# Patient Record
Sex: MALE | Race: BLACK OR AFRICAN AMERICAN
[De-identification: names, ages, dates, MRNs, and addresses within clinical notes are randomized per-mention and may not be internally consistent; named-entity substitution may affect disease eponyms.]

---

## 2019-02-26 ENCOUNTER — HOSPITAL ENCOUNTER (EMERGENCY)
Dept: HOSPITAL 72 - EMR | Age: 34
LOS: 1 days | Discharge: HOME | End: 2019-02-27
Payer: COMMERCIAL

## 2019-02-26 VITALS — HEIGHT: 71 IN | WEIGHT: 175 LBS | BODY MASS INDEX: 24.5 KG/M2

## 2019-02-26 VITALS — DIASTOLIC BLOOD PRESSURE: 74 MMHG | SYSTOLIC BLOOD PRESSURE: 124 MMHG

## 2019-02-26 DIAGNOSIS — R10.9: Primary | ICD-10-CM

## 2019-02-26 PROCEDURE — 80053 COMPREHEN METABOLIC PANEL: CPT

## 2019-02-26 PROCEDURE — 96374 THER/PROPH/DIAG INJ IV PUSH: CPT

## 2019-02-26 PROCEDURE — 99284 EMERGENCY DEPT VISIT MOD MDM: CPT

## 2019-02-26 PROCEDURE — 74177 CT ABD & PELVIS W/CONTRAST: CPT

## 2019-02-26 PROCEDURE — 83690 ASSAY OF LIPASE: CPT

## 2019-02-26 PROCEDURE — 81003 URINALYSIS AUTO W/O SCOPE: CPT

## 2019-02-26 PROCEDURE — 96375 TX/PRO/DX INJ NEW DRUG ADDON: CPT

## 2019-02-26 PROCEDURE — 85025 COMPLETE CBC W/AUTO DIFF WBC: CPT

## 2019-02-26 PROCEDURE — 36415 COLL VENOUS BLD VENIPUNCTURE: CPT

## 2019-02-26 NOTE — NUR
ED Nurse Note:

Patient walk in c/o abdominal pain radiating to left side of abdomen for 4 
days. Patient reports rash on torso and upper extremities for 3 days. AO4. NAD. 
Attached to monitor; VSS. IV access established. Blood drawn; sent down to lab.

## 2019-02-27 VITALS — DIASTOLIC BLOOD PRESSURE: 67 MMHG | SYSTOLIC BLOOD PRESSURE: 119 MMHG

## 2019-02-27 LAB
ADD MANUAL DIFF: NO
ALBUMIN SERPL-MCNC: 4 G/DL (ref 3.4–5)
ALBUMIN/GLOB SERPL: 1.1 {RATIO} (ref 1–2.7)
ALP SERPL-CCNC: 62 U/L (ref 46–116)
ALT SERPL-CCNC: 23 U/L (ref 12–78)
ANION GAP SERPL CALC-SCNC: 9 MMOL/L (ref 5–15)
APPEARANCE UR: CLEAR
APTT PPP: (no result) S
AST SERPL-CCNC: 18 U/L (ref 15–37)
BASOPHILS NFR BLD AUTO: 2 % (ref 0–2)
BILIRUB SERPL-MCNC: 0.4 MG/DL (ref 0.2–1)
BUN SERPL-MCNC: 16 MG/DL (ref 7–18)
CALCIUM SERPL-MCNC: 9.4 MG/DL (ref 8.5–10.1)
CHLORIDE SERPL-SCNC: 105 MMOL/L (ref 98–107)
CO2 SERPL-SCNC: 27 MMOL/L (ref 21–32)
CREAT SERPL-MCNC: 0.9 MG/DL (ref 0.55–1.3)
EOSINOPHIL NFR BLD AUTO: 2 % (ref 0–3)
ERYTHROCYTE [DISTWIDTH] IN BLOOD BY AUTOMATED COUNT: 12.8 % (ref 11.6–14.8)
GLOBULIN SER-MCNC: 3.5 G/DL
GLUCOSE UR STRIP-MCNC: NEGATIVE MG/DL
HCT VFR BLD CALC: 42.7 % (ref 42–52)
HGB BLD-MCNC: 15 G/DL (ref 14.2–18)
KETONES UR QL STRIP: NEGATIVE
LEUKOCYTE ESTERASE UR QL STRIP: NEGATIVE
LYMPHOCYTES NFR BLD AUTO: 45.1 % (ref 20–45)
MCV RBC AUTO: 85 FL (ref 80–99)
MONOCYTES NFR BLD AUTO: 7.2 % (ref 1–10)
NEUTROPHILS NFR BLD AUTO: 43.8 % (ref 45–75)
NITRITE UR QL STRIP: NEGATIVE
PH UR STRIP: 6 [PH] (ref 4.5–8)
PLATELET # BLD: 194 K/UL (ref 150–450)
POTASSIUM SERPL-SCNC: 4.1 MMOL/L (ref 3.5–5.1)
PROT UR QL STRIP: NEGATIVE
RBC # BLD AUTO: 5.02 M/UL (ref 4.7–6.1)
SODIUM SERPL-SCNC: 140 MMOL/L (ref 136–145)
SP GR UR STRIP: 1.01 (ref 1–1.03)
UROBILINOGEN UR-MCNC: NORMAL MG/DL (ref 0–1)
WBC # BLD AUTO: 8.6 K/UL (ref 4.8–10.8)

## 2019-02-27 NOTE — NUR
-------------------------------------------------------------------------------

          *** Note leida in EDM - 02/27/19 at 0402 by LCRISOSTOM ***          

-------------------------------------------------------------------------------

ED Nurse Note:

lactic reflex drawn; sent down to lab.

## 2019-02-27 NOTE — DIAGNOSTIC IMAGING REPORT
Indication: Abdominal pain

 

Technique: Continuous helical transaxial imaging of the abdomen and pelvis was

obtained from the lung bases to the pubic symphysis during intravenous contrast

administration. Coronal 2-D reformats were also obtained. Study obtained in a Siemens

sensation 64 slice CT.  Automatic Exposure Control was utilized.

 

Total Dose length Product (DLP):  735.36 mGycm

 

CT Dose Index Volume (CTDIvol):   13.68 mGy

 

Comparison: None

 

Findings: The lung bases are clear. The gallbladder is contracted and not evaluated

well on this examination. The liver and spleen, pancreas and kidneys, adrenal glands

appear normal. There is a moderate amount of fecal material in the colon. The

appendix is normal. The bladder is unremarkable.

 

IMPRESSION:

 

Negative examination. No acute findings appreciated

 

 

 

The CT scanner at [facility] is accredited by the [employer]

[employer] and the scans are performed using dose optimization techniques as

appropriate to a performed exam including Automatic Exposure control.

## 2019-02-27 NOTE — NUR
ED Nurse Note:

Patient cleared for discharge per ERMD. AO4. NAD. VSS. Patient given 
prescriptions and discharge instructions; verbalized understanding. IV and ID 
removed. Patient ambulated steady out of ED with all belongings.

## 2019-03-02 NOTE — EMERGENCY ROOM REPORT
History of Present Illness


General


Chief Complaint:  Abdominal Pain


Source:  Patient





Present Illness


HPI


Patient is a 33-year-old male presented after increased abdominal pain.  

Patient reports having increased Abdominal discomfort for approximate 3-4 days.

  He had associated abdominal distention.  He reports having pain primarily in 

the left lower abdomen.  He reports having some mild diarrhea.  He denies any 

fever or bloody stools.  He denies any prior surgeries.


Allergies:  


Coded Allergies:  


     No Known Allergies (Unverified , 2/26/19)





Patient History


Past Medical History:  see triage record


Reviewed Nursing Documentation:  PMH: Agreed; PSxH: Agreed





Nursing Documentation-PMH


Past Medical History:  No Stated History





Review of Systems


All Other Systems:  negative except mentioned in HPI





Physical Exam





Vital Signs








  Date Time  Temp Pulse Resp B/P (MAP) Pulse Ox O2 Delivery O2 Flow Rate FiO2


 


2/26/19 23:07 97.7 68 18 124/74 99 Room Air  








Sp02 EP Interpretation:  reviewed, normal


General Appearance:  normal inspection, well appearing, no apparent distress, 

alert, GCS 15


Head:  atraumatic


ENT:  normal ENT inspection, hearing grossly normal, normal voice


Neck:  normal inspection, full range of motion, supple, no bony tend


Respiratory:  normal inspection, lungs clear, normal breath sounds, no 

respiratory distress, no retraction, no wheezing


Cardiovascular #1:  regular rate, rhythm, no edema


Gastrointestinal:  normal inspection, normal bowel sounds, non tender, soft, no 

guarding, no hernia, abnormal bowel sounds


Genitourinary:  no CVA tenderness


Musculoskeletal:  normal inspection, back normal, normal range of motion


Neurologic:  normal inspection, alert, oriented x3, responsive, CNs III-XII nml 

as tested, speech normal


Psychiatric:  normal inspection, judgement/insight normal, mood/affect normal


Skin:  normal inspection, normal color, no rash





Medical Decision Making


Diagnostic Impression:  


 Primary Impression:  


 Nonspecific abdominal pain


ER Course


  Patient presented for abdominal pain. Differential diagnoses included 

ischemic bowel, appendicitis, perforated viscus, abdominal aortic aneurysm, 

inferior myocardial infarction, viral gastroenteritis among others.Because of 

complexity of patient's case laboratory testing and imaging studies were 

ordered.Patient's laboratory studies were unremarkable.  Patient was noted to 

have some improvement in symptoms after medications.CT of abdomen pelvis read 

by radiology showed : No acute intra-abdominal pathology.  Patient appears to 

be stable for outpatient management.Patient was given prescription for 

medications for symptomatic treatment.  He is advised to follow-up with his 

primary care physician for reexamination and to return if any other worsening 

of condition.





Labs








Test


  2/26/19


23:15 2/27/19


02:30


 


White Blood Count


  8.6 K/UL


(4.8-10.8) 


 


 


Red Blood Count


  5.02 M/UL


(4.70-6.10) 


 


 


Hemoglobin


  15.0 G/DL


(14.2-18.0) 


 


 


Hematocrit


  42.7 %


(42.0-52.0) 


 


 


Mean Corpuscular Volume 85 FL (80-99)  


 


Mean Corpuscular Hemoglobin


  29.9 PG


(27.0-31.0) 


 


 


Mean Corpuscular Hemoglobin


Concent 35.2 G/DL


(32.0-36.0) 


 


 


Red Cell Distribution Width


  12.8 %


(11.6-14.8) 


 


 


Platelet Count


  194 K/UL


(150-450) 


 


 


Mean Platelet Volume


  8.1 FL


(6.5-10.1) 


 


 


Neutrophils (%) (Auto)


  43.8 %


(45.0-75.0) 


 


 


Lymphocytes (%) (Auto)


  45.1 %


(20.0-45.0) 


 


 


Monocytes (%) (Auto)


  7.2 %


(1.0-10.0) 


 


 


Eosinophils (%) (Auto)


  2.0 %


(0.0-3.0) 


 


 


Basophils (%) (Auto)


  2.0 %


(0.0-2.0) 


 


 


Sodium Level


  140 MMOL/L


(136-145) 


 


 


Potassium Level


  4.1 MMOL/L


(3.5-5.1) 


 


 


Chloride Level


  105 MMOL/L


() 


 


 


Carbon Dioxide Level


  27 MMOL/L


(21-32) 


 


 


Anion Gap


  9 mmol/L


(5-15) 


 


 


Blood Urea Nitrogen


  16 mg/dL


(7-18) 


 


 


Creatinine


  0.9 MG/DL


(0.55-1.30) 


 


 


Estimat Glomerular Filtration


Rate > 60 mL/min


(>60) 


 


 


Glucose Level


  93 MG/DL


() 


 


 


Calcium Level


  9.4 MG/DL


(8.5-10.1) 


 


 


Total Bilirubin


  0.4 MG/DL


(0.2-1.0) 


 


 


Aspartate Amino Transf


(AST/SGOT) 18 U/L (15-37) 


  


 


 


Alanine Aminotransferase


(ALT/SGPT) 23 U/L (12-78) 


  


 


 


Alkaline Phosphatase


  62 U/L


() 


 


 


Total Protein


  7.5 G/DL


(6.4-8.2) 


 


 


Albumin


  4.0 G/DL


(3.4-5.0) 


 


 


Globulin 3.5 g/dL  


 


Albumin/Globulin Ratio 1.1 (1.0-2.7)  


 


Lipase


  151 U/L


() 


 


 


Urine Color  Pale yellow 


 


Urine Appearance  Clear 


 


Urine pH  6 (4.5-8.0) 


 


Urine Specific Gravity


  


  1.015


(1.005-1.035)


 


Urine Protein


  


  Negative


(NEGATIVE)


 


Urine Glucose (UA)


  


  Negative


(NEGATIVE)


 


Urine Ketones


  


  Negative


(NEGATIVE)


 


Urine Blood


  


  Negative


(NEGATIVE)


 


Urine Nitrite


  


  Negative


(NEGATIVE)


 


Urine Bilirubin


  


  Negative


(NEGATIVE)


 


Urine Urobilinogen


  


  Normal MG/DL


(0.0-1.0)


 


Urine Leukocyte Esterase


  


  Negative


(NEGATIVE)











Last Vital Signs








  Date Time  Temp Pulse Resp B/P (MAP) Pulse Ox O2 Delivery O2 Flow Rate FiO2


 


2/27/19 03:35 97.7 60 18 119/67 99 Room Air  








Status:  improved


Disposition:  HOME, SELF-CARE


Condition:  Improved


Scripts


Dicyclomine Hcl* (DICYCLOMINE HCL*) 10 Mg Capsule


10 MG PO QID, #20 CAP


   Prov: Maxx Mcpherson MD         2/27/19 


Ondansetron (Zofran) 4 Mg Tablet


4 MG ORAL Q6H PRN for Nausea & Vomiting, #30 TAB 0 Refills


   Prov: Maxx Mcpherson MD         2/27/19


Referrals:  


NOT CHOSEN IPA/MD,REFERRING


Patient Instructions:  Abdominal Pain, Adult











Maxx Mcpherson MD Mar 2, 2019 19:47